# Patient Record
Sex: FEMALE
[De-identification: names, ages, dates, MRNs, and addresses within clinical notes are randomized per-mention and may not be internally consistent; named-entity substitution may affect disease eponyms.]

---

## 2022-07-19 ENCOUNTER — APPOINTMENT (OUTPATIENT)
Dept: RHEUMATOLOGY | Facility: CLINIC | Age: 47
End: 2022-07-19

## 2022-07-19 VITALS
HEART RATE: 80 BPM | TEMPERATURE: 97.9 F | HEIGHT: 67 IN | DIASTOLIC BLOOD PRESSURE: 73 MMHG | BODY MASS INDEX: 24.33 KG/M2 | OXYGEN SATURATION: 96 % | SYSTOLIC BLOOD PRESSURE: 114 MMHG | WEIGHT: 155 LBS

## 2022-07-19 DIAGNOSIS — M25.551 PAIN IN RIGHT HIP: ICD-10-CM

## 2022-07-19 DIAGNOSIS — R20.2 PARESTHESIA OF SKIN: ICD-10-CM

## 2022-07-19 DIAGNOSIS — Z80.7 FAMILY HISTORY OF OTHER MALIGNANT NEOPLASMS OF LYMPHOID, HEMATOPOIETIC AND RELATED TISSUES: ICD-10-CM

## 2022-07-19 DIAGNOSIS — E04.1 NONTOXIC SINGLE THYROID NODULE: ICD-10-CM

## 2022-07-19 DIAGNOSIS — Z78.9 OTHER SPECIFIED HEALTH STATUS: ICD-10-CM

## 2022-07-19 DIAGNOSIS — M79.641 PAIN IN RIGHT HAND: ICD-10-CM

## 2022-07-19 PROBLEM — Z00.00 ENCOUNTER FOR PREVENTIVE HEALTH EXAMINATION: Status: ACTIVE | Noted: 2022-07-19

## 2022-07-19 PROCEDURE — 99204 OFFICE O/P NEW MOD 45 MIN: CPT

## 2022-07-19 RX ORDER — GABAPENTIN 300 MG/1
300 CAPSULE ORAL
Qty: 30 | Refills: 1 | Status: ACTIVE | COMMUNITY
Start: 2022-07-19 | End: 1900-01-01

## 2022-07-19 NOTE — HISTORY OF PRESENT ILLNESS
[FreeTextEntry1] : Referring physician: Katie Rodgers NP  \par \par 46 y/o F here for consultation\par \par Pt felt 3 weeks ago. Pt felt on R hand. Pt fell on R hand. Difficulty using R hand. Has some swelling around R thumb were she felt. \par \par Pt also felt from second floor and now has R groin pain. Difficulty walking. Pt says that her R hip hurts when standing long periods. \par Pt did chiropractor but it did not help. \par Pt says it "cracks" when she moves hips. \par Pt says feels heat in the feet. Feels fire under her feet. Report veins in shins. \par \par Pt was given medication, which did not help. \par \par No fevers, h/a, rashes, hair loss, oral ulcers, epistaxis, sinusitis,  swollen glands, dry mouth, dry eyes, CP, SOB, cough, vision changes, abdominal pain, GERD, n/v/d, blood in stool or urine, focal weakness, sensory loss,  Raynaud's, joint pain, swelling, weight loss. \par +pt feels palpitations

## 2022-07-19 NOTE — PHYSICAL EXAM
[General Appearance - Well Nourished] : well nourished [General Appearance - Well Developed] : well developed [Sclera] : the sclera and conjunctiva were normal [Auscultation Breath Sounds / Voice Sounds] : lungs were clear to auscultation bilaterally [Heart Sounds] : normal S1 and S2 [Heart Sounds Gallop] : no gallops [Murmurs] : no murmurs [Heart Sounds Pericardial Friction Rub] : no pericardial rub [Abdomen Soft] : soft [Abdomen Tenderness] : non-tender [] : no hepato-splenomegaly [Musculoskeletal - Swelling] : no joint swelling seen [FreeTextEntry1] : FROM of all joints [Oriented To Time, Place, And Person] : oriented to person, place, and time

## 2022-07-19 NOTE — CONSULT LETTER
[Dear  ___] : Dear ~TRESA, [Consult Letter:] : I had the pleasure of evaluating your patient, [unfilled]. [Consult Closing:] : Thank you very much for allowing me to participate in the care of this patient.  If you have any questions, please do not hesitate to contact me. [Sincerely,] : Sincerely, [FreeTextEntry2] : Katie Rodgers NP \par [address] [facility] [facility] [address] [address] [FreeTextEntry3] : Ilia Cuba MD

## 2022-07-19 NOTE — ASSESSMENT
[FreeTextEntry1] : 48 y/o F w pain after falls\par =R thumb pain, s/p fall\par =R hip crepitus; pain w standing long time\par \par Suspect pt right hand has ligament strain from fall. Will refer to hand PT. \par \par R hip pain might be secondary OA? Other structural abnormality. Will obtian imaging and refer to PT. \par \par Do not suspect inflammatory process or AI dz, given hx and exam. \par \par Gabapentin for pain \par \par Plan\par -PT referral, hand PT referral \par -Xrays of R hip\par -gabapentin 300mg HS for pain \par RTO iPRN

## 2022-07-27 ENCOUNTER — NON-APPOINTMENT (OUTPATIENT)
Age: 47
End: 2022-07-27